# Patient Record
Sex: FEMALE | URBAN - METROPOLITAN AREA
[De-identification: names, ages, dates, MRNs, and addresses within clinical notes are randomized per-mention and may not be internally consistent; named-entity substitution may affect disease eponyms.]

---

## 2020-02-04 ENCOUNTER — OFFICE VISIT (OUTPATIENT)
Dept: URBAN - METROPOLITAN AREA CLINIC 33 | Facility: CLINIC | Age: 30
End: 2020-02-04
Payer: COMMERCIAL

## 2020-02-04 PROCEDURE — 99204 OFFICE O/P NEW MOD 45 MIN: CPT | Performed by: OPTOMETRIST

## 2020-02-04 ASSESSMENT — INTRAOCULAR PRESSURE
OD: 17
OS: 19

## 2020-02-04 NOTE — IMPRESSION/PLAN
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 Plan: Patient reports increase to migraine headaches over the last two years with increased soreness to the eyes. Patient sees Dr. Lalo Hobson, neurology, who is managing patient's condition. Fundus examination shows mild optic nerve edema OU. ONH-OCT OU ordered and reviewed confirmed fundus findings of optic nerve edema OU. Examination results will be sent to Dr. Lalo Hobson for review. Recommend imaging of brain and orbits (last MRI performed 2 years ago). If imaging appears normal, recommend Lumbar Puncture (last performed 5 years ago) to investigate bilateral optic nerve edema in conjunction with increased migraine headaches.

## 2020-05-06 ENCOUNTER — OFFICE VISIT (OUTPATIENT)
Dept: URBAN - METROPOLITAN AREA CLINIC 33 | Facility: CLINIC | Age: 30
End: 2020-05-06
Payer: COMMERCIAL

## 2020-05-06 DIAGNOSIS — H47.093 OTHER DISORDER OF BILATERAL OPTIC NERVE: Primary | ICD-10-CM

## 2020-05-06 PROCEDURE — 99213 OFFICE O/P EST LOW 20 MIN: CPT | Performed by: OPTOMETRIST

## 2020-05-06 ASSESSMENT — INTRAOCULAR PRESSURE
OS: 14
OD: 15

## 2020-05-06 NOTE — IMPRESSION/PLAN
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 Plan: Patient reports consistent headaches causing her to isolate and wait for them to go away. Patient sees Dr. Thanh Charles, neurology, who is managing patient's migraine headaches. Most recent imaging done 2 years ago, most recent Lumbar Puncture 5 years ago. Fundus examination shows mild optic nerve edema OU same as 3 months ago. Patient had office visit with her neurologist, Dr. Thanh Charles, canceled due to [address]. Encouraged patient to continue reaching out to her neurologist for examination, otherwise patient may seek treatment at ED.